# Patient Record
Sex: MALE | Race: WHITE | NOT HISPANIC OR LATINO | Employment: FULL TIME | URBAN - METROPOLITAN AREA
[De-identification: names, ages, dates, MRNs, and addresses within clinical notes are randomized per-mention and may not be internally consistent; named-entity substitution may affect disease eponyms.]

---

## 2017-12-27 ENCOUNTER — TELEPHONE (OUTPATIENT)
Dept: ADMINISTRATIVE | Facility: HOSPITAL | Age: 62
End: 2017-12-27

## 2018-03-02 ENCOUNTER — CLINICAL SUPPORT (OUTPATIENT)
Dept: REHABILITATION | Facility: HOSPITAL | Age: 63
End: 2018-03-02
Payer: MEDICAID

## 2018-03-02 DIAGNOSIS — R53.1 WEAKNESS: ICD-10-CM

## 2018-03-02 DIAGNOSIS — M54.2 NECK PAIN: ICD-10-CM

## 2018-03-02 DIAGNOSIS — M25.60 DECREASED MOBILITY OF JOINT: ICD-10-CM

## 2018-03-02 PROCEDURE — 97162 PT EVAL MOD COMPLEX 30 MIN: CPT | Mod: PO | Performed by: PHYSICAL MEDICINE & REHABILITATION

## 2018-03-02 NOTE — PLAN OF CARE
Name:Raad Crow  Physician:No ref. provider found  Date of eval:3/2/2018  Orders:  Physical Therapy evaluate and treat  Clinic: [id number]  Diagnosis:  1. Neck pain     2. Decreased mobility of joint     3. Weakness         Precautions: poor posture  Evaluation date: 3/2/2018  Visit # authorized: 1/1  Authorization period: 12/31/18  Plan of care expiration: 4/13/18    Start time: 9:00 AM  Stop Time: 10:00 AM    Timed     Procedure  Min     Units                         Untimed     Procedure  Min  Units   IE 60 1                 Subjective     Chief complaint: Patient states has had pain in left side of his neck and left shoulder for about 2 years and pain has progressively gotten worse. Patient states pain in left side of neck has become worse in last year and is constant. Patient states he is right hand dominant.  Onset: 2 years   Mechanism of onset :  gradual    Radicular symptoms:into left shoulder and LUE to lef tpalm and left fingers--pain, tingling and numbness   Dizziness:(+) from medication--morphine and oxycodone   Aggravating factors: Looking upward, reaching with LUE  Easing factors: morphine, oxycodone, Advil  Sleep is not disturbed. Sleeping position: sleeps sitting up in the bed  PLOF  Includes:Performs ADL's with moderate pain  Prior Physical Therapy:None  Current functional limitations: moving his head, reaching and lifting LUE    Home/Living Environment: Lives with family with 5 steps with rail to enter home    Pertinent PMH/Comorbidities:Lung Cancer. 2017-chemo and radiation and now has infusion every 3 weeks    Patients structured exercise routine: None  Exercise routine prior to onset: None    Occupation: Pt is retired.    Allergies:  Review of patient's allergies indicates:  No Known Allergies    Medical history:   Past Medical History:   Diagnosis Date    Diabetes mellitus, type 2     Hypertension     Obese        Medication:   Current Outpatient Prescriptions on File Prior to Visit  "  Medication Sig Dispense Refill    amlodipine (NORVASC) 10 MG tablet Take 1 tablet (10 mg total) by mouth once daily. 90 tablet 2    doxycycline (VIBRAMYCIN) 100 MG Cap Take 1 capsule (100 mg total) by mouth 2 (two) times daily. Take with food 14 capsule 0    glimepiride (AMARYL) 2 MG tablet Take once daily with largest meal of the day 90 tablet 2    lisinopril-hydrochlorothiazide (PRINZIDE,ZESTORETIC) 20-12.5 mg per tablet Take 2 tablets by mouth once daily. 180 tablet 2    metformin (GLUCOPHAGE) 1000 MG tablet Take 1 tablet (1,000 mg total) by mouth 2 (two) times daily with meals. 180 tablet 3    metoprolol tartrate (LOPRESSOR) 50 MG tablet Take one tablet once a day for blood pressure 90 tablet 2    polyethylene glycol (GOLYTELY,NULYTELY) 236-22.74-6.74 -5.86 gram suspension As directed 1 Bottle 0    tamsulosin (FLOMAX) 0.4 mg Cp24 Take 1 capsule (0.4 mg total) by mouth once daily. Take 0.5 hour after same meal of the day 90 capsule 2     No current facility-administered medications on file prior to visit.          MRI: None on file           Xray: None on file    Pain level with 0 being the lowest and 10 being the highest presently: 3/10  Pain level with 0 being the lowest and 10 being the highest at worst: 9/10  Pain level with 0 being the lowest and 10 being the highest at best: 3/10     Patient Goals: "I want to have better posture"    Objective     Postural examination in standing and sitting:  - (+) severe forward head  -(+)  forward shoulders  - (+)  thoracic kyphosis  - (+) decreasedlumbar lordosis        Functional assessment:   - walking/gait: ambulates independent of AD with normal gait pattern of BLE's. Patient holds head forward and looking down  - sit to stand: I  - sit to supine: I        - supine to sit: I  - supine to prone: NA    Cervical AROM: Pain/Dysfunction with Movement:   Flexion  From 45deg : 30  No c/o pain   Extension from 45 deg flexion: : 10 No c/o pain   Right side bending " "45 deg flexion: 20 C/o pulling left side of neck   Left side bending from 45 deg flexion: 20 No c/o pain   Right rotation from 45 deg flexion: 50 C/o pulling left side of neck   Left rotation: from 45 deg flexion: 30 C/o pain left side of neck       Shoulder Right   Left Pain/Dysfunction with Movement    AROM MMT AROM PROM    flexion 175  50 70 C/o pain at end range left shld   extension 80  45     abduction 180  45 70 C/o pain at end range left shld      adduction 0  0 0    Internal rotation 85  At 45 deg abd 60     ER at 90° abd 80  At 45 deg abd 5  C/o pain at end range left shld           (#) Measured w/ dynomometer   Right  65 #   Left    5 #         Muscle Strength  MMT R L   Elbow flexion 5/5 5/5   Elbow extension 5/5 5/5   Shoulder flexion 5/5 3-/5   Shoulder abduction 5/5 3-/5   Shoulder external rotation 5/5 3/5   Shoulder internal rotation 5/5 3/5        Upper trap 5/5 3/5   Middle trap 4/5 3/5   Lower trap 4/5 3/5   Rhomboids 4/5 3/5     MMT R L   Cervical flexion 5/5 5/5   Cervical extension 5/5 5/5   Cervical sidebending 4/5 4/5   Cervical rotation 4/5 4/5     Nerve Results Quality   Median (+) LUE pain and pulling   Ulnar (+)  LUE pain and pulling   Radial (-)         Endurance is Good    Palpation: Patient c/o pain lefr clavicle, left upper,middle and lower trapezius muscles, posterior shoulder    Joint mobility: Hypomobility left shoulder    Sensation: Intact bilateral uper arms and forearms and right hand/impaired to gross light touch left hand dorsal and palmar surface  Reflexes: 2+ RUE/diminished LUE      Outcome measures:    Impairment function:  Carrying and handling objects  FOTO  score: 33/100    TREATMENT: Patient seen for initial evaluation per insurance authorization and given HEP.     Date 3/2/18   Visit IE   FOTO  1/5   POC Exp Date 4/13/18   Face to Face        Reclined Shld Flexion w/ wand 1 x 5   SL Shld ER 1 x 5   Seated Scap Retrac 5 x 5"   Standing    Shld Ext w/ wand 1 x 5 " "  Isometric Exs left shld    Flexion 5 x 5'   Extension 5 x 5"   Abduction 5 x 5"   Adduction 5 x 5"   ER 5 x 5"   IR 5 x 5"           Initials VK         Pt. Education: Instructed pt. regarding:proper technique with all exercises. Pt. to demonstrate good understanding of the education provided. Raad demonstrated good return demonstration of activities. No cultural, environmental, or spiritual barriers identified to treatment or learning.    Assessment   This is a 62 y.o. male referred to outpatient physical therapy and presents with a medical diagnosis of neck and left shoulder pain and PT diagnosis/findings of neck and left shoulder pain, decreased cervical and left shoulder AROM, decreased strength and poor posture demonstrating limitations as described in the problem list. Patient was in agreement with set goals and plan of care. Pt was given a written HEP along with posture education, instruction on body mechanics, activity modification/avoid and left shoulderance, and scapular strengthening and left shoulder AROM regimen. Pt. verbally understood instructions and demonstrated proper form/technique. Pt was advised to perform these exercises free of pain, and discontinue use if symptoms persist/worsen. Pt will benefit from physical therapy services in order to maximize pain free functional independence. Rehab potential is good      History  Co-morbidities and personal factors that may impact the plan of care Examination  Body Structures and Functions, activity limitations and participation restrictions that may impact the plan of care    Clinical Presentation   Co-morbidities:   Lung Cancer        Personal Factors:   no deficits Body Regions:   neck  upper extremities    Body Systems:   ROM  strength  Posture        Participation Restrictions:   General tasks and demands   Activity limitations:   Mobility  lifting and carrying objects    Self care  washing oneself (bathing, drying, washing hands)    Domestic " Life  shopping  cooking  doing house work (cleaning house, washing dishes, laundry)    Interactions/Relationships  no deficits    Life Areas  no deficits    Community and Social Life  no deficits         evolving clinical presentation with changing clinical characteristics                      moderate   moderate  moderate Decision Making/ Complexity Score:  moderate       Medical necessity is demonstrated by the following IMPAIRMENTS/PROBLEM LIST:  Decreased range of motion neck and left shoulder  Decreased strength left shoulder  Poor posture  Decreased scapular stabilization  Disturbed sleep  Increased pain with reading and looking upward  Increased pain with overhead reach  Increased pain with ADL and household activities  Functional impairment rating of: 65%    GOALS:   Short Term Goals:  3 weeks  Increase range of motion 25%  Increase strength 1/2 muscle grade  Increase postural awareness to holding trunk erect and shoulders retracted to help bring his head out of forward position to allow for patient to look upward and forward with ADL's  Be able to perform HEP with minimal cueing required  Improve functional impairment of carrying and handling objects to 40%    Long Term Goals: 6 weeks  Increase range of motion to 75%to 100% of full  Increase strength 1 muscle grade  Increase strength to 4/5 to 4+/5 with MMT  Improve scapular stabilization to normal  Restore normal sleep habits without disturbances due to pain  Restore ability to read without increased pain  Restore ability to reach fully overhead without increased pain  Restore ability to perform ADL's and household activities independently and without increased pain  Pt to be independent with HEP to improve ROM and independence with ADL's  Improve functional impairment of carrying and handling objects to 20%      Plan     Pt will be treated by physical therapy 2 times a week for 6 weeks to include:   Therapeutic exercises to increase ROM, strength and  stabilization; joint and soft tissue mobilization with manual therapy techniques to decrease muscle tightness, pain and improve joint mobility; neuromuscular re-education to improve posture, coordination, kinesthetic sense and proprioception, therapeutic activities to improve coordination, strength and function, therapeutic taping to decrease pain, provide support and improve function of UE(s); modalities such as moist heat, ice, ultrasound and electrical stimulation to increase circulation, decrease pain and inflammation; dry needling with manual therapy techniques to decrease pain, inflammation and swelling, increase circulation and promote healing process will be considered and utilized as needed; aquatic physical therapy will be utilized as needed.  Pt may be seen by PTA to carry out plan of care as part of Rehab team.      I certify the need for these services furnished under this plan of treatment and while under my care.    ____________________________________ Physician/Referring Practitioner                                Date of Signature            Jeanine Reeves PT

## 2018-03-02 NOTE — PATIENT INSTRUCTIONS
ROM: Flexion - Wand (reclined)        Lie on back holding wand. Raise arms over head.   Repeat __5__ times per set. Do __1__ sets per session. Do __2__ sessions per day.     [email or web address]           Copyright © [employer]. All rights reserved.              Sidelying Shoulder Abduction            Sidelying Shoulder Abduction    Lie on your right/left side with right/left arm on top. Keep your elbow straight. Lift your arm upward toward your head.  Perform __5__ times. Perform ___1_sets.   Perform __2__ times per day.  Copyright © 6762-5391 [employer]ROM:     Scapular Retraction (sitting or Standing)    With arms at sides, squeeze shoulder blades together. Do not shrug and do not hold your breath. Hold 5 seconds. Repeat 5 times 1 sessions -2 x day.       Extension - Wand (Standing)        Stand holding wand behind back. Raise arms as far as possible.  Repeat __5__ times per set. Do __1__ sets per session. Do __2__ sessions per day.     [email or web address]     Copyright © [employer]. All rights reserved.       Perform 5 times holding for count of 5. Perform 2 times per day           ISOMETRIC FLEXION  Gently push your fist forward into a wall with  your elbow bent.          ISOMETRIC EXTENSION  Gently push your a bent elbow back into a  wall.          ISOMETRIC ADDUCTION  Gently push your elbow into the side of your  Body.            ISOMETRIC ABDUCTION  Gently push your elbow out to the side into a  wall with your elbow bent.             ISOMETRIC EXTERNAL ROTATION  Gently press your hand into a wall using the  back side of your hand. Maintain a bent  elbow the entire time.          ISOMETRIC INTERNAL ROTATION  Gently press your hand into a wall using the  palm side of your hand. Maintain a bent  elbow the entire time.

## 2018-03-14 ENCOUNTER — CLINICAL SUPPORT (OUTPATIENT)
Dept: REHABILITATION | Facility: HOSPITAL | Age: 63
End: 2018-03-14
Payer: MEDICAID

## 2018-03-14 DIAGNOSIS — M25.60 DECREASED MOBILITY OF JOINT: ICD-10-CM

## 2018-03-14 DIAGNOSIS — R53.1 WEAKNESS: ICD-10-CM

## 2018-03-14 DIAGNOSIS — M54.2 NECK PAIN: ICD-10-CM

## 2018-03-14 PROCEDURE — 97110 THERAPEUTIC EXERCISES: CPT | Mod: PO | Performed by: PHYSICAL MEDICINE & REHABILITATION

## 2018-03-14 NOTE — PROGRESS NOTES
"Name: Raad Crow  Clinic Number: [id number]  Diagnosis: No diagnosis found.  Physician: Nathaly Ritter MD  Treatment Orders: PT Eval and Treat  Past Medical History:   Diagnosis Date    Diabetes mellitus, type 2     Hypertension     Obese        Precautions: Poor Posture  Visit # authorized: 1/6 on 3/14/2018  Authorization period: 4/16/18  Plan of care expiration: 4/13/18    Start time: 10:50 AM  Stop Time: 11:45 AM    Timed     Procedure  Min     Units   TE x 3 45 3   MT  10 1        Total 55  4     Untimed     Procedure  Min  Units                      Subjective     Pt reports: having pain in left side of neck and left shoulder. States took pain medication (morphine, gabapentin and oxycodone) 2 hours ago.     Pain Scale: before treatment: 9/10 currently; after treatment: 7/10    Objective       TREATMENT  Therapeutic exercise: Raad received therapeutic exercises to develop strength, ROM, flexibility and posture for 45 minutes including:     Date 3/14/18 3/2/18   Visit 1/6 IE   FOTO  2/5 1/5   POC Exp Date  Ins Auth Ends 4/13/18 4/16/18 4/13/18   Face to Face          Reclined Shld Flexion w/ wand 2 x 5 1 x 5   Reclined Shld Abd w/ wand 2 x 5 LUE    Chin Tucks reclined with towel roll 10 x 5"    SL Shld ER 2 x 10 1 x 5   Seated Scap Retrac 2  x10 5 x 5"   Standing Shld Ext w/ wand 1  x10 1 x 5   Isometric Exs left shoulder     Flexion 10 x 5" 5 x 5'   Extension 10 x 5" 5 x 5"   Abduction 10 x 5" 5 x 5"   Adduction 10 x 5" 5 x 5"   ER 10 x 5" 5 x 5"   IR 10 x 5" 5 x 5"        Wall Push Ups 1  x10    Shoulder Rolls 1  x10         Cervical Extension 1 x 10    Cervical Rotation 1 x 10    Cervical side bends 1 x10         UBE 2'/2'                   Initials VK VK       Manual Therapy: to left shoulder x 10 mins to include: Grade I mobs to left G-H joint to decrease joint tightness and passive stretch to left shoulder to increase mobiity.    Written Home Exercises Provided: Cervical AROM, Shoulder Rolls, " Wall push ups, Shld Abd with wand, chin tucks  Pt demo good understanding of the education provided. Raad demonstrated good return demonstration of activities.     Pt. education:  · Posture reeducation, body mechanics, HEP,   · No spiritual or educational barriers to learning provided  · Pt has no cultural, educational or language barriers to learning provided.    Assessment     Patient received manual therapy to left shoulder as described above. Patient performed above exercise program with verbal cueing for proper technique and tolerated addition of cervical AROM, wall push ups, shoulder Abd w/ wand and shoulder rolls; chin tucks and UBE.  Patient continues to display poor cervical/thoracic posture and tends to hold LUE internally rotated, adducted with, elbow flexed. Patient required constant verbal cueing to relax LUE and to allow this arm to sway at his side when standing and walking. Patient instructed to continue with HEP. Pain in left shoulder decreased post exercise.  Pt will continue to benefit from skilled outpatient physical therapy to address the remaining functional deficits, provide pt/family education, and to maximize pt's level of independence in the home and community environment. .     Goals:   Short Term Goals:  3 weeks  Increase range of motion 25%  Increase strength 1/2 muscle grade  Increase postural awareness to holding trunk erect and shoulders retracted to help bring his head out of forward position to allow for patient to look upward and forward with ADL's  Be able to perform HEP with minimal cueing required  Improve functional impairment of carrying and handling objects to 40%     Long Term Goals: 6 weeks  Increase range of motion to 75%to 100% of full  Increase strength 1 muscle grade  Increase strength to 4/5 to 4+/5 with MMT  Improve scapular stabilization to normal  Restore normal sleep habits without disturbances due to pain  Restore ability to read without increased pain  Restore  ability to reach fully overhead without increased pain  Restore ability to perform ADL's and household activities independently and without increased pain  Pt to be independent with HEP to improve ROM and independence with ADL's  Improve functional impairment of carrying and handling objects to 20%         Anticipated barriers to physical therapy: None  Pt's spiritual, cultural and educational needs considered and pt agreeable to plan of care and goals        Plan   Continue with established Plan of Care towards PT goals.              Jeanine Reeves, PT

## 2018-03-14 NOTE — PATIENT INSTRUCTIONS
Wall Push-ups      Facing wall with both hands on wall at shoulder height and shoulder-width apart. Keeping body straight, lean forward allowing elbows to bend then push out again.  Repeat 10 times. Do 2 sessions 1-2x per day.      ROM: Abduction - Wand        Holding wand with left hand palm up, push wand directly out to side, leading with other hand palm down, until stretch is felt.   Repeat ___10 _ times per set. Do __1__ sets per session. Do __2__ sessions per day.     [email or web address]     Copyright © [employer]. All rights reserved.     Chin Tuck, Sitting / Standing    Stand or sit, head in comfortable, centered position. Draw chin in towards throat, pulling head straight back, keeping jaw and eyes level. Do not hold your breath. Hold 5 seconds.  Repeat 10 times per session. Do 2 sessions per day.      Chin Tuck, Supine    Lie on your back, head on small, rolled towel. Gently tuck chin and bring toward your throat while pushing the back of your head down. Do not lift your head or look towards your feet. Hold 5 seconds. Do not hold your breath.  Repeat 10 times per session. Do 2 sessions per day.    AROM: Neck Extension        Bend head backward. .  Repeat ___10_ times per set. Do _1___ sets per session. Do __2__ sessions per day.     [email or web address]       AROM: Neck Rotation        Turn head slowly to look over one shoulder, then the other. .  Repeat __10__ times per set. Do __1__ sets per session. Do ___2_ sessions per day.    AROM: Lateral Neck Flexion        Slowly tilt head toward one shoulder, then the other. .  Repeat __10__ times per set. Do _1___ sets per session. Do _2___ sessions per day.     [email or web address]://[email or web address]          Copyright © [employer]. All rights reserved.   AROM: Lateral Neck Flexion        Slowly tilt head toward one shoulder, then the other. Hold each position ____ seconds.  Repeat ____ times per set. Do ____ sets per session. Do ____ sessions per day.     [email or web address]://[email or web address]      Copyright © [employer]. All rights reserved.               SHOULDER ROLLS  Move your shoulders in a circular pattern as  shown so that your are moving in an up,  back and down direction. Perform small  cicles if needed for comfort.    Perform 10 times. Perform 2 times per day.AROM: Lateral Neck Flexion

## 2018-03-16 ENCOUNTER — CLINICAL SUPPORT (OUTPATIENT)
Dept: REHABILITATION | Facility: HOSPITAL | Age: 63
End: 2018-03-16
Payer: MEDICAID

## 2018-03-16 DIAGNOSIS — M25.60 DECREASED MOBILITY OF JOINT: ICD-10-CM

## 2018-03-16 DIAGNOSIS — M54.2 NECK PAIN: ICD-10-CM

## 2018-03-16 DIAGNOSIS — R53.1 WEAKNESS: ICD-10-CM

## 2018-03-16 PROCEDURE — 97110 THERAPEUTIC EXERCISES: CPT | Mod: PO

## 2018-03-16 NOTE — PROGRESS NOTES
"Name: Raad Crow  Clinic Number: [id number]  Diagnosis:   1. Neck pain     2. Decreased mobility of joint     3. Weakness         Physician: Nathaly Ritter MD  Treatment Orders: PT Eval and Treat  Past Medical History:   Diagnosis Date    Diabetes mellitus, type 2     Hypertension     Obese        Precautions: Poor Posture  Visit # authorized: 2/6 on 3/16/2018  Authorization period: 4/16/18  Plan of care expiration: 4/13/18    Start time: 8:04 AM  Stop Time: 8:45 AM    Timed     Procedure  Min     Units   TE x 3 41 3             Total 41 3     Untimed     Procedure  Min  Units                      Subjective     Pt reports: he continues with pain in left side of neck and left shoulder, although not as bad as last visit.  Patient reports having trouble breathing today.    Pain Scale: before treatment: 5/10 currently; after treatment: 5/10    Objective       TREATMENT  Therapeutic exercise: Raad received therapeutic exercises, along with today's progressions, 1:1 with PTA x 41 minutes to develop strength, ROM, flexibility and posture including:       Date  03/16/18 3/14/18 3/2/18   VISIT 2/6 1/6 IE   FOTO 3/5 2/5 1/5   POC Exp Date  Ins Auth Ends 04/13/18 04/16/18 4/13/18 4/16/18 4/13/18   FACE-TO-FACE 04/02/18           TABLE:      Reclined Shld Flexion w/ wand 2 x 5 2 x 5 1 x 5   Reclined Shld Abd w/ wand 2 x 5 LUE 2 x 5 LUE    Chin Tucks reclined with towel roll 10 x 5" 10 x 5"    SL Shld ER 2 x 10 2 x 10 1 x 5   Seated Scap Retrac 2 x 10 2 x 10 5 x 5"   Standing Shld Ext w/ wand 1 x 15 1 x 10 1 x 5   Isometric Exs left shoulder      Flexion 10 x 5" 10 x 5" 5 x 5"   Extension  10 x 5" 10 x 5" 5 x 5"   Abduction  10 x 5" 10 x 5" 5 x 5"   Adduction  10 x 5" 10 x 5" 5 x 5"   ER 10 x 5" 10 x 5" 5 x 5"   IR 10 x 5" 10 x 5" 5 x 5"         Wall Push Ups 1 x 10 1 x 10    Shoulder Rolls 1 x 15 1 x 10          Cervical Extension 1 x 15 1 x 10    Cervical Rotation 1 x 15 1 x 10    Cervical side bends 1 x 15 1 x " 10          UBE 2'/2' 2'/2'                      Initials GWA 1/6 VK VK       Manual Therapy: to left shoulder x 10 mins to include: Grade I mobs to left G-H joint to decrease joint tightness and passive stretch to left shoulder to increase mobiity. Not performed today.    Written Home Exercises Provided: continue with HEP.  Pt demo good understanding of the education provided. Raad demonstrated good return demonstration of activities.     Pt. education:  · Posture reeducation, body mechanics, HEP,   · No spiritual or educational barriers to learning provided  · Pt has no cultural, educational or language barriers to learning provided.    Assessment     Manual therapy was not performed today due to patient having difficulty breathing lying in reclined position, at one point patient had to stop to go to his truck for his inhaler in order to continue.  Patient performed above exercise program with verbal cueing for proper technique and tolerated today's progressions in reps.  Patient continues to display poor cervical/thoracic posture and tends to hold LUE internally rotated, adducted with, elbow flexed. Patient required constant verbal cueing to relax LUE and to allow this arm to sway at his side when standing and walking. Patient instructed to continue with HEP. Pain in left shoulder decreased post exercise.  Pt will continue to benefit from skilled outpatient physical therapy to address the remaining functional deficits, provide pt/family education, and to maximize pt's level of independence in the home and community environment. .     Goals:   Short Term Goals:  3 weeks  Increase range of motion 25%  Increase strength 1/2 muscle grade  Increase postural awareness to holding trunk erect and shoulders retracted to help bring his head out of forward position to allow for patient to look upward and forward with ADL's  Be able to perform HEP with minimal cueing required  Improve functional impairment of carrying and  handling objects to 40%     Long Term Goals: 6 weeks  Increase range of motion to 75%to 100% of full  Increase strength 1 muscle grade  Increase strength to 4/5 to 4+/5 with MMT  Improve scapular stabilization to normal  Restore normal sleep habits without disturbances due to pain  Restore ability to read without increased pain  Restore ability to reach fully overhead without increased pain  Restore ability to perform ADL's and household activities independently and without increased pain  Pt to be independent with HEP to improve ROM and independence with ADL's  Improve functional impairment of carrying and handling objects to 20%         Anticipated barriers to physical therapy: None  Pt's spiritual, cultural and educational needs considered and pt agreeable to plan of care and goals        Plan   Continue with established Plan of Care towards PT goals.

## 2018-03-21 ENCOUNTER — CLINICAL SUPPORT (OUTPATIENT)
Dept: REHABILITATION | Facility: HOSPITAL | Age: 63
End: 2018-03-21
Payer: MEDICAID

## 2018-03-21 DIAGNOSIS — M54.2 NECK PAIN: ICD-10-CM

## 2018-03-21 DIAGNOSIS — R53.1 WEAKNESS: ICD-10-CM

## 2018-03-21 DIAGNOSIS — M25.60 DECREASED MOBILITY OF JOINT: ICD-10-CM

## 2018-03-21 PROCEDURE — 97110 THERAPEUTIC EXERCISES: CPT | Mod: PO

## 2018-03-21 PROCEDURE — 97140 MANUAL THERAPY 1/> REGIONS: CPT | Mod: PO

## 2018-03-21 NOTE — PROGRESS NOTES
"Name: Raad Crow  Clinic Number: [id number]  Diagnosis:   1. Neck pain     2. Decreased mobility of joint     3. Weakness         Physician: Nathaly Ritter MD  Treatment Orders: PT Eval and Treat  Past Medical History:   Diagnosis Date    Diabetes mellitus, type 2     Hypertension     Obese        Precautions: Poor Posture  Visit # authorized: 3/6 on 3/21/2018  Authorization period: 4/16/18  Plan of care expiration: 4/13/18    Start time: 11:00 AM  Stop Time: 11:40 AM    Timed     Procedure  Min     Units   TE x 2 30 2   MT 10 1        Total 40 3     Untimed     Procedure  Min  Units                      Subjective     Pt reports: no new complaints, continues with pain in left side of neck and left shoulder    Pain Scale: before treatment: 5/10 currently; after treatment: 4/10    Objective       TREATMENT  Therapeutic exercise: Raad received therapeutic exercises, along with today's progressions, 1:1 with PTA x 40 minutes to develop strength, ROM, flexibility and posture including:       Date  03/21/18 03/16/18 3/14/18 3/2/18   VISIT 3/6 2/6 1/6 IE   FOTO 4/5 3/5 2/5 1/5   POC Exp Date  Ins Auth Ends 04/13/18 04/02/18 04/13/18 04/16/18 4/13/18 4/16/18 4/13/18   FACE-TO-FACE 04/02/18 04/02/18            TABLE:       Reclined Shld Flexion w/ wand 3 x 5 2 x 5 2 x 5 1 x 5   Reclined Shld Abd w/ wand 2 x 5 LUE 2 x 5 LUE 2 x 5 LUE    Chin Tucks reclined with towel roll 10 x 5" 10 x 5" 10 x 5"    SL Shld ER 2 x 10 2 x 10 2 x 10 1 x 5   Seated Scap Retrac 2 x 10 2 x 10 2 x 10 5 x 5"   Standing Shld Ext w/ wand 2 x 10 1 x 15 1 x 10 1 x 5   Isometric Exs left shoulder       Flexion 10 x 5" 10 x 5" 10 x 5" 5 x 5"   Extension  10 x 5" 10 x 5" 10 x 5" 5 x 5"   Abduction  10 x 5" 10 x 5" 10 x 5" 5 x 5"   Adduction  10 x 5" 10 x 5" 10 x 5" 5 x 5"   ER 10 x 5" 10 x 5" 10 x 5" 5 x 5"   IR 10 x 5" 10 x 5" 10 x 5" 5 x 5"          Wall Push Ups 1 x 10 1 x 10 1 x 10    Shoulder Rolls 1 x 20 1 x 15 1 x 10         "   Cervical Extension 1 x 15 1 x 15 1 x 10    Cervical Rotation 1 x 15 1 x 15 1 x 10    Cervical side bends 1 x 15 1 x 15 1 x 10           UBE 2'/2' 2'/2' 2'/2'           Initials GWA 2/6 GWA 1/6 VK VK       Manual Therapy: to left shoulder x 10 mins to include: Grade I mobs to left G-H joint to decrease joint tightness and passive stretch to left shoulder to increase mobiity.     Written Home Exercises Provided: continue with HEP.  Pt demo good understanding of the education provided. Raad demonstrated good return demonstration of activities.     Pt. education:  · Posture reeducation, body mechanics, HEP,   · No spiritual or educational barriers to learning provided  · Pt has no cultural, educational or language barriers to learning provided.    Assessment      Patient received manual therapy to left shoulder as described above.  Patient performed above exercise program with verbal cueing for proper technique and tolerated today's progressions in reps.  Patient continues to display poor cervical/thoracic posture.  Patient required constant verbal cueing to relax LUE and to allow this arm to sway at his side when standing and walking.  Patient instructed to continue with HEP.  Pain in left shoulder decreased post exercise.  Pt will continue to benefit from skilled outpatient physical therapy to address the remaining functional deficits, provide pt/family education, and to maximize pt's level of independence in the home and community environment. .     Goals:   Short Term Goals:  3 weeks  Increase range of motion 25%  Increase strength 1/2 muscle grade  Increase postural awareness to holding trunk erect and shoulders retracted to help bring his head out of forward position to allow for patient to look upward and forward with ADL's  Be able to perform HEP with minimal cueing required  Improve functional impairment of carrying and handling objects to 40%     Long Term Goals: 6 weeks  Increase range of motion to 75%to  100% of full  Increase strength 1 muscle grade  Increase strength to 4/5 to 4+/5 with MMT  Improve scapular stabilization to normal  Restore normal sleep habits without disturbances due to pain  Restore ability to read without increased pain  Restore ability to reach fully overhead without increased pain  Restore ability to perform ADL's and household activities independently and without increased pain  Pt to be independent with HEP to improve ROM and independence with ADL's  Improve functional impairment of carrying and handling objects to 20%         Anticipated barriers to physical therapy: None  Pt's spiritual, cultural and educational needs considered and pt agreeable to plan of care and goals        Plan   Continue with established Plan of Care towards PT goals.

## 2018-03-23 ENCOUNTER — CLINICAL SUPPORT (OUTPATIENT)
Dept: REHABILITATION | Facility: HOSPITAL | Age: 63
End: 2018-03-23
Payer: MEDICAID

## 2018-03-23 DIAGNOSIS — M54.2 NECK PAIN: ICD-10-CM

## 2018-03-23 DIAGNOSIS — R53.1 WEAKNESS: ICD-10-CM

## 2018-03-23 DIAGNOSIS — M25.60 DECREASED MOBILITY OF JOINT: ICD-10-CM

## 2018-03-23 PROCEDURE — 97140 MANUAL THERAPY 1/> REGIONS: CPT | Mod: PO

## 2018-03-23 PROCEDURE — 97110 THERAPEUTIC EXERCISES: CPT | Mod: PO

## 2018-03-23 NOTE — PROGRESS NOTES
"Name: Raad Crow  Clinic Number: [id number]  Diagnosis:   1. Neck pain     2. Decreased mobility of joint     3. Weakness         Physician: Nathaly Ritter MD  Treatment Orders: PT Eval and Treat  Past Medical History:   Diagnosis Date    Diabetes mellitus, type 2     Hypertension     Obese        Precautions: Poor Posture  Visit # authorized: 4/6 on 3/23/2018  Authorization period: 4/16/18  Plan of care expiration: 4/13/18    Start time: 9:00 AM  Stop Time: 9:40 AM    Timed     Procedure  Min     Units   TE x 2 15 1   MT 10 1   TE sup 15  0   Total 40 2     Untimed     Procedure  Min  Units                      Subjective     Pt reports: pain in left side of neck and left shoulder about the same, no change.    Pain Scale: before treatment: 5/10 currently; after treatment: 5/10    Objective       TREATMENT  Therapeutic exercise: Raad received therapeutic exercises, along with new exercises added today, 1:1 with PTA x 15 minutes and supervised exercises by PTA x 15 minutes to develop strength, ROM, flexibility and posture including:       Date  03/23/18 03/21/18 03/16/18 3/14/18 3/2/18   VISIT 4/6 3/6 2/6 1/6 IE   FOTO 5/5 4/5 3/5 2/5 1/5   POC Exp Date  Ins Auth Ends 04/13/18 04/02/18 04/13/18 04/02/18 04/13/18 04/16/18 4/13/18 4/16/18 4/13/18   FACE-TO-FACE 04/02/18 04/02/18 04/02/18             TABLE:        Reclined Shld Flexion w/ wand 3 x 5 3 x 5 2 x 5 2 x 5 1 x 5   Reclined Shld Abd w/ wand 3 x 5 LUE 2 x 5 LUE 2 x 5 LUE 2 x 5 LUE    Chin Tucks reclined with towel roll 10 x 5" 10 x 5" 10 x 5" 10 x 5"    SL Shld ER 2 x 10 2 x 10 2 x 10 2 x 10 1 x 5   Seated Scap Retrac 2 x 10 2 x 10 2 x 10 2 x 10 5 x 5"   Standing Shld Ext w/ wand 2 x 10 2 x 10 1 x 15 1 x 10 1 x 5   Isometric Exs left shoulder        Flexion 10 x 5" 10 x 5" 10 x 5" 10 x 5" 5 x 5"   Extension  10 x 5" 10 x 5" 10 x 5" 10 x 5" 5 x 5"   Abduction  10 x 5" 10 x 5" 10 x 5" 10 x 5" 5 x 5"   Adduction  10 x 5" 10 x 5" 10 x 5" 10 x 5" 5 " "x 5"   ER 10 x 5" 10 x 5" 10 x 5" 10 x 5" 5 x 5"   IR 10 x 5" 10 x 5" 10 x 5" 10 x 5" 5 x 5"           Wall Push Ups 1 x 10 1 x 10 1 x 10 1 x 10    Shoulder Rolls 1 x 20 1 x 20 1 x 15 1 x 10            Cervical Extension 1 x 15 1 x 15 1 x 15 1 x 10    Cervical Rotation 1 x 15 1 x 15 1 x 15 1 x 10    Cervical side bends 1 x 15 1 x 15 1 x 15 1 x 10            Rows  1 x 10 RTB       I's 1 x 10 RTB               UBE 2'/2' 2'/2' 2'/2' 2'/2'            Initials GWA 3/6 GWA 2/6 GWA 1/6 VK VK     Functional limitation reporting disability percentage was obtained through use of FOTO neck Survey indicating 41% disability     Manual Therapy: to left shoulder x 10 mins to include: Grade I mobs to left G-H joint to decrease joint tightness and passive stretch to left shoulder to increase mobiity.     Written Home Exercises Provided: continue with HEP/ theraband rows and I's.  Pt demo good understanding of the education provided. Raad demonstrated good return demonstration of activities.     Pt. education:  · Posture reeducation, body mechanics, HEP,   · No spiritual or educational barriers to learning provided  · Pt has no cultural, educational or language barriers to learning provided.    Assessment      Patient received manual therapy to left shoulder as described above.  Patient performed above exercise program with verbal cueing for proper technique and tolerated addition of theraband rows and I's.  Patient continues to display poor cervical/thoracic posture.  Patient required constant verbal cueing to relax LUE and to allow this arm to sway at his side when standing and walking.  Patient instructed to continue with HEP.  Pain in left shoulder decreased post exercise.  Pt will continue to benefit from skilled outpatient physical therapy to address the remaining functional deficits, provide pt/family education, and to maximize pt's level of independence in the home and community environment. .     Goals:   Short Term Goals: "  3 weeks  Increase range of motion 25%  Increase strength 1/2 muscle grade  Increase postural awareness to holding trunk erect and shoulders retracted to help bring his head out of forward position to allow for patient to look upward and forward with ADL's  Be able to perform HEP with minimal cueing required  Improve functional impairment of carrying and handling objects to 40%     Long Term Goals: 6 weeks  Increase range of motion to 75%to 100% of full  Increase strength 1 muscle grade  Increase strength to 4/5 to 4+/5 with MMT  Improve scapular stabilization to normal  Restore normal sleep habits without disturbances due to pain  Restore ability to read without increased pain  Restore ability to reach fully overhead without increased pain  Restore ability to perform ADL's and household activities independently and without increased pain  Pt to be independent with HEP to improve ROM and independence with ADL's  Improve functional impairment of carrying and handling objects to 20%         Anticipated barriers to physical therapy: None  Pt's spiritual, cultural and educational needs considered and pt agreeable to plan of care and goals        Plan   Continue with established Plan of Care towards PT goals.

## 2018-04-02 ENCOUNTER — CLINICAL SUPPORT (OUTPATIENT)
Dept: REHABILITATION | Facility: HOSPITAL | Age: 63
End: 2018-04-02
Payer: MEDICAID

## 2018-04-02 DIAGNOSIS — M54.2 NECK PAIN: ICD-10-CM

## 2018-04-02 DIAGNOSIS — R53.1 WEAKNESS: ICD-10-CM

## 2018-04-02 DIAGNOSIS — M25.60 DECREASED MOBILITY OF JOINT: ICD-10-CM

## 2018-04-02 PROCEDURE — 97140 MANUAL THERAPY 1/> REGIONS: CPT | Mod: PO

## 2018-04-02 PROCEDURE — 97110 THERAPEUTIC EXERCISES: CPT | Mod: PO

## 2018-04-02 NOTE — PROGRESS NOTES
"Name: Raad Crow  Clinic Number: [id number]  Diagnosis:   1. Neck pain     2. Decreased mobility of joint     3. Weakness         Physician: Nathaly Ritter MD  Treatment Orders: PT Eval and Treat  Past Medical History:   Diagnosis Date    Diabetes mellitus, type 2     Hypertension     Obese        Precautions: Poor Posture  Visit # authorized: 5/6 on 3/23/2018  Authorization period: 4/16/18  Plan of care expiration: 4/13/18    Start time: 1:00 PM  Stop Time: 1:40 PM    Timed     Procedure  Min     Units   TE x 2 30 2   MT 10 1   TE sup 0 0   Total 40 3     Untimed     Procedure  Min  Units                      Subjective     Pt reports: pain in left side of neck and left shoulder. Pt presents with L elbow flexion, supported with RUE.     Pain Scale: before treatment: 3/10 currently; after treatment: 3/10    Objective       TREATMENT  Therapeutic exercise: Raad received therapeutic exercises, along with new exercises added today, 1:1 with PT x 40 minutes to develop strength, ROM, flexibility and posture including:       Date  04/02/18 03/23/18 03/21/18 03/16/18 3/14/18 3/2/18   VISIT 5/6 4/6 3/6 2/6 1/6 IE   FOTO  5/5 4/5 3/5 2/5 1/5   POC Exp Date  Ins Auth Ends 4/13/18 4/16/18 04/13/18 04/02/18 04/13/18 04/02/18 04/13/18 04/16/18 4/13/18 4/16/18 4/13/18   FACE-TO-FACE  04/02/18 04/02/18 04/02/18              TABLE:         Reclined Shld Flexion w/ wand 3 x 5 3 x 5 3 x 5 2 x 5 2 x 5 1 x 5   Reclined Shld Abd w/ wand 3 x 5 LUE 3 x 5 LUE 2 x 5 LUE 2 x 5 LUE 2 x 5 LUE    Chin Tucks reclined with towel roll 10 x 5" 10 x 5" 10 x 5" 10 x 5" 10 x 5"    SL Shld ER 2 x 10 2 x 10 2 x 10 2 x 10 2 x 10 1 x 5   Seated Scap Retrac 2 x 10 2 x 10 2 x 10 2 x 10 2 x 10 5 x 5"   Standing Shld Ext w/ wand 2 x 10 2 x 10 2 x 10 1 x 15 1 x 10 1 x 5   Isometric Exs left shoulder         Flexion 10 x 5" 10 x 5" 10 x 5" 10 x 5" 10 x 5" 5 x 5"   Extension  10 x 5" 10 x 5" 10 x 5" 10 x 5" 10 x 5" 5 x 5"   Abduction  10 x 5" " "10 x 5" 10 x 5" 10 x 5" 10 x 5" 5 x 5"   Adduction  10 x 5" 10 x 5" 10 x 5" 10 x 5" 10 x 5" 5 x 5"   ER 10 x 5" 10 x 5" 10 x 5" 10 x 5" 10 x 5" 5 x 5"   IR 10 x 5" 10 x 5" 10 x 5" 10 x 5" 10 x 5" 5 x 5"            Wall Push Ups 1x10 1 x 10 1 x 10 1 x 10 1 x 10    Shoulder Rolls 1 x 20 1 x 20 1 x 20 1 x 15 1 x 10             Cervical Extension 1 x 15 1 x 15 1 x 15 1 x 15 1 x 10    Cervical Rotation 1 x 15 1 x 15 1 x 15 1 x 15 1 x 10    Cervical side bends 1 x 15 1 x 15 1 x 15 1 x 15 1 x 10             Rows  1 x 10 RTB 1 x 10 RTB       I's 1 x 10 RTB 1 x 10 RTB                UBE 2'/2' 2'/2' 2'/2' 2'/2' 2'/2'             Initials  GWA 3/6 GWA 2/6 GWA 1/6 VK VK     Functional limitation reporting disability percentage was obtained through use of FOTO neck Survey indicating 41% disability     Manual Therapy: to left shoulder x 10 mins to include: Grade II mobs to left G-H joint to decrease joint tightness and passive stretch to left shoulder to increase mobiity.     Written Home Exercises Provided: continue with HEP/ theraband rows and I's.  Pt demo good understanding of the education provided. Raad demonstrated good return demonstration of activities.     Pt. education:  · Posture reeducation, body mechanics, HEP,   · No spiritual or educational barriers to learning provided  · Pt has no cultural, educational or language barriers to learning provided.    Assessment      Patient received manual therapy to left shoulder as described above.  Patient performed above exercise program with verbal and tactile cueing for proper technique. Patient requires constant reminders to improve upright posture and relax LUE with all standing and seated activities. Patient instructed to continue with HEP.  Pain in left shoulder decreased post exercise.  Pt will continue to benefit from skilled outpatient physical therapy to address the remaining functional deficits, provide pt/family education, and to maximize pt's level of " independence in the home and community environment. .     Goals:   Short Term Goals:  3 weeks  Increase range of motion 25%  Increase strength 1/2 muscle grade  Increase postural awareness to holding trunk erect and shoulders retracted to help bring his head out of forward position to allow for patient to look upward and forward with ADL's  Be able to perform HEP with minimal cueing required  Improve functional impairment of carrying and handling objects to 40%     Long Term Goals: 6 weeks  Increase range of motion to 75%to 100% of full  Increase strength 1 muscle grade  Increase strength to 4/5 to 4+/5 with MMT  Improve scapular stabilization to normal  Restore normal sleep habits without disturbances due to pain  Restore ability to read without increased pain  Restore ability to reach fully overhead without increased pain  Restore ability to perform ADL's and household activities independently and without increased pain  Pt to be independent with HEP to improve ROM and independence with ADL's  Improve functional impairment of carrying and handling objects to 20%         Anticipated barriers to physical therapy: None  Pt's spiritual, cultural and educational needs considered and pt agreeable to plan of care and goals        Plan   Continue with established Plan of Care towards PT goals.    Mera Young, PT , DPT

## 2018-04-05 ENCOUNTER — TELEPHONE (OUTPATIENT)
Dept: REHABILITATION | Facility: HOSPITAL | Age: 63
End: 2018-04-05

## 2018-04-05 NOTE — TELEPHONE ENCOUNTER
Mr. Mignon gaffney showed for his physical therapy appointment scheduled today, 4/5/18. Patient was called and he stated he was sick. Patient rescheduled for next week.    Jeanine Reeves, PT

## 2018-04-11 ENCOUNTER — CLINICAL SUPPORT (OUTPATIENT)
Dept: REHABILITATION | Facility: HOSPITAL | Age: 63
End: 2018-04-11
Payer: MEDICAID

## 2018-04-11 DIAGNOSIS — M25.60 DECREASED MOBILITY OF JOINT: ICD-10-CM

## 2018-04-11 DIAGNOSIS — M54.2 NECK PAIN: ICD-10-CM

## 2018-04-11 DIAGNOSIS — R53.1 WEAKNESS: ICD-10-CM

## 2018-04-11 PROCEDURE — 97110 THERAPEUTIC EXERCISES: CPT | Mod: PO | Performed by: PHYSICAL MEDICINE & REHABILITATION

## 2018-04-11 NOTE — PROGRESS NOTES
DISCHARGE SUMMARY    Name: Raad Crow  Clinic Number: [id number]  Diagnosis:   1. Neck pain     2. Decreased mobility of joint     3. Weakness         Physician: Nathaly Ritter MD  Treatment Orders: PT Eval and Treat  Past Medical History:   Diagnosis Date    Diabetes mellitus, type 2     Hypertension     Obese        Precautions: Poor Posture  Visit # authorized: 6/6 on 4/11/2018  Authorization period: 4/16/18  Plan of care expiration: 4/13/18    Start time: 1:00 PM  Stop Time: 1:45 PM    Timed     Procedure  Min     Units   TE x 2 35 2   MT 10 1        Total 40 3     Untimed     Procedure  Min  Units                      Subjective     Pt reports: pain in left side of neck and left shoulder. Pt presents with L elbow flexion, supported with RUE and  forward head and shoulders . Patient states he can move his left shoulder better, but still with difficulty lifting his head. Patient states he would like to continue therapy.     Pain Scale: before treatment: 5/10 currently; after treatment: 5/10    Objective                                                                                              DISCHARGE  CHARGE       4/11/18  Cervical AROM: Pain/Dysfunction with Movement:   Flexion  from head at 45deg forward : 30 No c/o pain   Extension from head at 45 deg flexion: : 10 No c/o pain   Right side bending head at 45 deg flexion: 20 C/o pulling left side of neck   Left side bending from 45 deg flexion: 20 No c/o pain   Right rotation from 45 deg flexion: 50 C/o pulling left side of neck   Left rotation: from 45 deg flexion: 30 C/o pain left side of neck         Shoulder Right  Left Left Pain/Dysfunction with Movement     AROM AROM PROM     flexion 175 50 70 C/o pain at end range left shld   extension 80 45       abduction 180 45 70 C/o pain at end range left shld        adduction 0 0 0     Internal rotation 85 At 45 deg abd 60       ER at 90° abd 80 At 45 deg abd 5   C/o pain at end range left shld              (#) Measured w/ dynomometer   Right  65 #   Left    5 #            Muscle Strength  MMT R L   Elbow flexion 5/5 5/5   Elbow extension 5/5 5/5   Shoulder flexion 5/5 3-/5   Shoulder abduction 5/5 3-/5   Shoulder external rotation 5/5 3/5   Shoulder internal rotation 5/5 3/5           Upper trap 5/5 4/5   Middle trap 4/5 4/5   Lower trap 4/5 4-/5   Rhomboids 4/5 4-/5      MMT R L   Cervical flexion 5/5 5/5   Cervical extension 5/5 5/5   Cervical sidebending 4/5 4/5   Cervical rotation 4/5 4/5      (#) Measured w/ dynomometer   Right  65 #   Left    5 #          Nerve Results Quality   Median (+) LUE pain and pulling   Ulnar (+)  LUE pain and pulling   Radial (-)             3/2/18  Cervical AROM: Pain/Dysfunction with Movement:   Flexion  From 45deg : 30  No c/o pain   Extension from 45 deg flexion: : 10 No c/o pain   Right side bending 45 deg flexion: 20 C/o pulling left side of neck   Left side bending from 45 deg flexion: 20 No c/o pain   Right rotation from 45 deg flexion: 50 C/o pulling left side of neck   Left rotation: from 45 deg flexion: 30 C/o pain left side of neck         Shoulder Right Left  Left Pain/Dysfunction with Movement     AROM AROM PROM     flexion 175 50 70 C/o pain at end range left shld   extension 80 45       abduction 180 45 70 C/o pain at end range left shld       adduction 0 0 0     Internal rotation 85 At 45 deg abd 60       ER at 90° abd 80 At 45 deg abd 5   C/o pain at end range left shld              (#) Measured w/ dynomometer   Right  65 #   Left    5 #            Muscle Strength  MMT R L   Elbow flexion 5/5 5/5   Elbow extension 5/5 5/5   Shoulder flexion 5/5 3-/5   Shoulder abduction 5/5 3-/5   Shoulder external rotation 5/5 3/5   Shoulder internal rotation 5/5 3/5           Upper trap 5/5 3/5   Middle trap 4/5 3/5   Lower  "trap 4/5 3/5   Rhomboids 4/5 3/5      MMT R L   Cervical flexion 5/5 5/5   Cervical extension 5/5 5/5   Cervical sidebending 4/5 4/5   Cervical rotation 4/5 4/5      Nerve Results Quality   Median (+) LUE pain and pulling   Ulnar (+)  LUE pain and pulling   Radial (-)             TREATMENT  Therapeutic exercise: Raad received therapeutic exercises, along with new exercises added today, 1:1 with PT x 35 minutes to develop strength, ROM, flexibility and posture including:       Date  4/11/18 04/02/18 03/23/18 03/21/18 03/16/18 3/14/18 3/2/18   VISIT 6/6 5/6 4/6 3/6 2/6 1/6 IE   FOTO --  5/5 4/5 3/5 2/5 1/5   POC Exp Date  Ins Auth Ends 4/13/18 4/16/18 4/13/18 4/16/18 04/13/18 04/02/18 04/13/18 04/02/18 04/13/18 04/16/18 4/13/18 4/16/18 4/13/18   FACE-TO-FACE   04/02/18 04/02/18 04/02/18               TABLE:          Reclined Shld Flexion w/ wand 2 x 10 3 x 5 3 x 5 3 x 5 2 x 5 2 x 5 1 x 5   Reclined Shld Abd w/ wand 2 x 10 3 x 5 LUE 3 x 5 LUE 2 x 5 LUE 2 x 5 LUE 2 x 5 LUE    Chin Tucks reclined with towel roll 2 x 10 x 5" 10 x 5" 10 x 5" 10 x 5" 10 x 5" 10 x 5"    SL Shld ER 2 x 10 2 x 10 2 x 10 2 x 10 2 x 10 2 x 10 1 x 5   Seated Scap Retrac 2 x 10 x 5" 2 x 10 2 x 10 2 x 10 2 x 10 2 x 10 5 x 5"   Standing Shld Ext w/ wand 2 x 10  2 x 10 2 x 10 2 x 10 1 x 15 1 x 10 1 x 5   Isometric Exs left shoulder          Flexion 10 x 5" 10 x 5" 10 x 5" 10 x 5" 10 x 5" 10 x 5" 5 x 5"   Extension  10 x 5" 10 x 5" 10 x 5" 10 x 5" 10 x 5" 10 x 5" 5 x 5"   Abduction  10 x 5" 10 x 5" 10 x 5" 10 x 5" 10 x 5" 10 x 5" 5 x 5"   Adduction  10 x 5" 10 x 5" 10 x 5" 10 x 5" 10 x 5" 10 x 5" 5 x 5"   ER 10 x 5" 10 x 5" 10 x 5" 10 x 5" 10 x 5" 10 x 5" 5 x 5"   IR 10 x 5" 10 x 5" 10 x 5" 10 x 5" 10 x 5" 10 x 5" 5 x 5"             Wall Push Ups 1 x 10 1x10 1 x 10 1 x 10 1 x 10 1 x 10    Shoulder Rolls 2 x 10 1 x 20 1 x 20 1 x 20 1 x 15 1 x 10              Cervical Extension 1 x 15 1 x 15 1 x 15 1 x 15 1 x 15 1 x 10    Cervical Rotation 1  " x15 1 x 15 1 x 15 1 x 15 1 x 15 1 x 10    Cervical side bends 1  X 15 1 x 15 1 x 15 1 x 15 1 x 15 1 x 10              Rows  2 x 10 RTB 1 x 10 RTB 1 x 10 RTB       I's 2  x10 RTB 1 x 10 RTB 1 x 10 RTB                 UBE oot 2'/2' 2'/2' 2'/2' 2'/2' 2'/2'              Initials VK  GWA 3/6 GWA 2/6 GWA 1/6 VK VK     FOTO Score: 41/100  Manual Therapy: to left shoulder x 10 mins to include: Grade II mobs to left G-H joint to decrease joint tightness and passive stretch to left shoulder to increase mobiity.     Written Home Exercises Provided: continue with HEP/ theraband rows and I's.  Pt demo good understanding of the education provided. Raad demonstrated good return demonstration of activities.     Pt. education:  · Posture reeducation, body mechanics, HEP,   · No spiritual or educational barriers to learning provided  · Pt has no cultural, educational or language barriers to learning provided.    Assessment      Reassessment for end of insurance authorization. Patient has completed 6 authorized visits. No change in cervical or left shoulder AROM; strength of left shoulder and scapular region remains in 3-/5 to 3/5 range; patient continues to display poor posture with forward head and shoulders and holds LUE close to his side with elbow flexed, shoulder adducted and internally rotated.  Patient received manual therapy to left shoulder as described above.  Patient performed above exercise program with verbal and tactile cueing for proper technique. Patient requires constant reminders to improve upright posture and relax LUE with all standing and seated activities. Do to poor progress, and  not meeting  STG or LTG, I do not feel Mr. Crow will benefit from further physical therapy .    Goals: NOT Met  Short Term Goals:  3 weeks   Increase range of motion 25%  Increase strength 1/2 muscle grade  Increase postural awareness to holding trunk erect and shoulders retracted to help bring his head out of forward position to  allow for patient to look upward and forward with ADL's  Be able to perform HEP with minimal cueing required  Improve functional impairment of carrying and handling objects to 40%     Long Term Goals: 6 weeks Not Met  Increase range of motion to 75%to 100% of full  Increase strength 1 muscle grade  Increase strength to 4/5 to 4+/5 with MMT  Improve scapular stabilization to normal  Restore normal sleep habits without disturbances due to pain  Restore ability to read without increased pain  Restore ability to reach fully overhead without increased pain  Restore ability to perform ADL's and household activities independently and without increased pain  Pt to be independent with HEP to improve ROM and independence with ADL's  Improve functional impairment of carrying and handling objects to 20%     Anticipated barriers to physical therapy: None  Pt's spiritual, cultural and educational needs considered and pt agreeable to plan of care and goals        Plan   Discharge from physical therapy.

## 2018-05-16 ENCOUNTER — TELEPHONE (OUTPATIENT)
Dept: ADMINISTRATIVE | Facility: HOSPITAL | Age: 63
End: 2018-05-16